# Patient Record
(demographics unavailable — no encounter records)

---

## 2024-11-06 NOTE — INTERPRETER SERVICES
[Time Spent: ____ minutes] : Total time spent using  services: [unfilled] minutes. The patient's primary language is not English thus required  services. [Interpreters_IDNumber] : [id number] [Interpreters_FullName] : Candido

## 2024-11-06 NOTE — HISTORY OF PRESENT ILLNESS
[FreeTextEntry1] : Came with c/o b/l ear pain  Cervicalgia for few days [de-identified] : Patient is 70 year male  with PMH of Migraine, BPH , came today with c/o b/l ear pain for about for about 1 wks C/o cervicalgia for about 2 days  Also c/o Nocturia Currently not on any medications